# Patient Record
Sex: MALE | Race: WHITE | NOT HISPANIC OR LATINO | Employment: OTHER | URBAN - METROPOLITAN AREA
[De-identification: names, ages, dates, MRNs, and addresses within clinical notes are randomized per-mention and may not be internally consistent; named-entity substitution may affect disease eponyms.]

---

## 2025-08-06 ENCOUNTER — HOSPITAL ENCOUNTER (EMERGENCY)
Facility: HOSPITAL | Age: OVER 89
Discharge: HOME | End: 2025-08-06
Attending: EMERGENCY MEDICINE
Payer: MEDICARE

## 2025-08-06 ENCOUNTER — APPOINTMENT (OUTPATIENT)
Dept: CARDIOLOGY | Facility: HOSPITAL | Age: OVER 89
End: 2025-08-06
Payer: MEDICARE

## 2025-08-06 ENCOUNTER — TELEPHONE (OUTPATIENT)
Dept: HEMATOLOGY/ONCOLOGY | Facility: HOSPITAL | Age: OVER 89
End: 2025-08-06
Payer: MEDICARE

## 2025-08-06 ENCOUNTER — APPOINTMENT (OUTPATIENT)
Dept: RADIOLOGY | Facility: HOSPITAL | Age: OVER 89
End: 2025-08-06
Payer: MEDICARE

## 2025-08-06 VITALS
HEART RATE: 60 BPM | DIASTOLIC BLOOD PRESSURE: 65 MMHG | TEMPERATURE: 97.7 F | WEIGHT: 180 LBS | HEIGHT: 74 IN | BODY MASS INDEX: 23.1 KG/M2 | RESPIRATION RATE: 20 BRPM | OXYGEN SATURATION: 98 % | SYSTOLIC BLOOD PRESSURE: 142 MMHG

## 2025-08-06 DIAGNOSIS — I95.9 HYPOTENSION, UNSPECIFIED HYPOTENSION TYPE: ICD-10-CM

## 2025-08-06 DIAGNOSIS — R91.1 LUNG NODULE: Primary | ICD-10-CM

## 2025-08-06 LAB
ALBUMIN SERPL BCP-MCNC: 4.1 G/DL (ref 3.4–5)
ALP SERPL-CCNC: 61 U/L (ref 33–136)
ALT SERPL W P-5'-P-CCNC: 11 U/L (ref 10–52)
ANION GAP SERPL CALC-SCNC: 12 MMOL/L (ref 10–20)
APPEARANCE UR: CLEAR
AST SERPL W P-5'-P-CCNC: 15 U/L (ref 9–39)
ATRIAL RATE: 66 BPM
BASOPHILS # BLD AUTO: 0.1 X10*3/UL (ref 0–0.1)
BASOPHILS NFR BLD AUTO: 1.1 %
BILIRUB SERPL-MCNC: 0.4 MG/DL (ref 0–1.2)
BILIRUB UR STRIP.AUTO-MCNC: NEGATIVE MG/DL
BNP SERPL-MCNC: 113 PG/ML (ref 0–99)
BUN SERPL-MCNC: 23 MG/DL (ref 6–23)
CALCIUM SERPL-MCNC: 8.9 MG/DL (ref 8.6–10.3)
CARDIAC TROPONIN I PNL SERPL HS: 6 NG/L (ref 0–20)
CARDIAC TROPONIN I PNL SERPL HS: 8 NG/L (ref 0–20)
CHLORIDE SERPL-SCNC: 103 MMOL/L (ref 98–107)
CO2 SERPL-SCNC: 28 MMOL/L (ref 21–32)
COLOR UR: NORMAL
CREAT SERPL-MCNC: 1.46 MG/DL (ref 0.5–1.3)
EGFRCR SERPLBLD CKD-EPI 2021: 44 ML/MIN/1.73M*2
EOSINOPHIL # BLD AUTO: 0.2 X10*3/UL (ref 0–0.4)
EOSINOPHIL NFR BLD AUTO: 2.2 %
ERYTHROCYTE [DISTWIDTH] IN BLOOD BY AUTOMATED COUNT: 12.7 % (ref 11.5–14.5)
FLUAV RNA RESP QL NAA+PROBE: NOT DETECTED
FLUBV RNA RESP QL NAA+PROBE: NOT DETECTED
GLUCOSE SERPL-MCNC: 128 MG/DL (ref 74–99)
GLUCOSE UR STRIP.AUTO-MCNC: NORMAL MG/DL
HCT VFR BLD AUTO: 38.5 % (ref 41–52)
HGB BLD-MCNC: 12.6 G/DL (ref 13.5–17.5)
IMM GRANULOCYTES # BLD AUTO: 0.02 X10*3/UL (ref 0–0.5)
IMM GRANULOCYTES NFR BLD AUTO: 0.2 % (ref 0–0.9)
INR PPP: 1.2 (ref 0.9–1.1)
KETONES UR STRIP.AUTO-MCNC: NEGATIVE MG/DL
LEUKOCYTE ESTERASE UR QL STRIP.AUTO: NEGATIVE
LYMPHOCYTES # BLD AUTO: 1.79 X10*3/UL (ref 0.8–3)
LYMPHOCYTES NFR BLD AUTO: 19.6 %
MAGNESIUM SERPL-MCNC: 2.25 MG/DL (ref 1.6–2.4)
MCH RBC QN AUTO: 29.9 PG (ref 26–34)
MCHC RBC AUTO-ENTMCNC: 32.7 G/DL (ref 32–36)
MCV RBC AUTO: 91 FL (ref 80–100)
MONOCYTES # BLD AUTO: 0.87 X10*3/UL (ref 0.05–0.8)
MONOCYTES NFR BLD AUTO: 9.5 %
NEUTROPHILS # BLD AUTO: 6.15 X10*3/UL (ref 1.6–5.5)
NEUTROPHILS NFR BLD AUTO: 67.4 %
NITRITE UR QL STRIP.AUTO: NEGATIVE
NRBC BLD-RTO: 0 /100 WBCS (ref 0–0)
P AXIS: 37 DEGREES
P OFFSET: 201 MS
P ONSET: 146 MS
PH UR STRIP.AUTO: 7 [PH]
PLATELET # BLD AUTO: 238 X10*3/UL (ref 150–450)
POTASSIUM SERPL-SCNC: 4.3 MMOL/L (ref 3.5–5.3)
PR INTERVAL: 160 MS
PROT SERPL-MCNC: 7 G/DL (ref 6.4–8.2)
PROT UR STRIP.AUTO-MCNC: NEGATIVE MG/DL
PROTHROMBIN TIME: 12.8 SECONDS (ref 9.8–12.4)
Q ONSET: 226 MS
QRS COUNT: 10 BEATS
QRS DURATION: 130 MS
QT INTERVAL: 468 MS
QTC CALCULATION(BAZETT): 490 MS
QTC FREDERICIA: 483 MS
R AXIS: -5 DEGREES
RBC # BLD AUTO: 4.21 X10*6/UL (ref 4.5–5.9)
RBC # UR STRIP.AUTO: NEGATIVE MG/DL
RSV RNA RESP QL NAA+PROBE: NOT DETECTED
SARS-COV-2 RNA RESP QL NAA+PROBE: NOT DETECTED
SODIUM SERPL-SCNC: 139 MMOL/L (ref 136–145)
SP GR UR STRIP.AUTO: 1.01
T AXIS: 20 DEGREES
T OFFSET: 460 MS
UROBILINOGEN UR STRIP.AUTO-MCNC: NORMAL MG/DL
VENTRICULAR RATE: 66 BPM
WBC # BLD AUTO: 9.1 X10*3/UL (ref 4.4–11.3)

## 2025-08-06 PROCEDURE — 84484 ASSAY OF TROPONIN QUANT: CPT

## 2025-08-06 PROCEDURE — 85610 PROTHROMBIN TIME: CPT

## 2025-08-06 PROCEDURE — 2500000004 HC RX 250 GENERAL PHARMACY W/ HCPCS (ALT 636 FOR OP/ED)

## 2025-08-06 PROCEDURE — 99285 EMERGENCY DEPT VISIT HI MDM: CPT | Mod: 25 | Performed by: EMERGENCY MEDICINE

## 2025-08-06 PROCEDURE — 71045 X-RAY EXAM CHEST 1 VIEW: CPT

## 2025-08-06 PROCEDURE — 99285 EMERGENCY DEPT VISIT HI MDM: CPT | Performed by: EMERGENCY MEDICINE

## 2025-08-06 PROCEDURE — 85025 COMPLETE CBC W/AUTO DIFF WBC: CPT

## 2025-08-06 PROCEDURE — 83880 ASSAY OF NATRIURETIC PEPTIDE: CPT

## 2025-08-06 PROCEDURE — 93005 ELECTROCARDIOGRAM TRACING: CPT

## 2025-08-06 PROCEDURE — 83735 ASSAY OF MAGNESIUM: CPT

## 2025-08-06 PROCEDURE — 36415 COLL VENOUS BLD VENIPUNCTURE: CPT

## 2025-08-06 PROCEDURE — 84075 ASSAY ALKALINE PHOSPHATASE: CPT

## 2025-08-06 PROCEDURE — 96360 HYDRATION IV INFUSION INIT: CPT

## 2025-08-06 PROCEDURE — 87637 SARSCOV2&INF A&B&RSV AMP PRB: CPT

## 2025-08-06 PROCEDURE — 71045 X-RAY EXAM CHEST 1 VIEW: CPT | Performed by: RADIOLOGY

## 2025-08-06 PROCEDURE — 81003 URINALYSIS AUTO W/O SCOPE: CPT

## 2025-08-06 RX ADMIN — SODIUM CHLORIDE, SODIUM LACTATE, POTASSIUM CHLORIDE, AND CALCIUM CHLORIDE 1000 ML: .6; .31; .03; .02 INJECTION, SOLUTION INTRAVENOUS at 09:45

## 2025-08-06 ASSESSMENT — PAIN SCALES - GENERAL: PAINLEVEL_OUTOF10: 0 - NO PAIN

## 2025-08-06 ASSESSMENT — PAIN - FUNCTIONAL ASSESSMENT: PAIN_FUNCTIONAL_ASSESSMENT: 0-10

## 2025-08-06 NOTE — DISCHARGE INSTRUCTIONS
You were sent into the emergency department from your assisted living facility for low blood pressure.  Your blood pressure was not low for EMS.  Your blood pressure was not low here.  This may have been an inaccurate blood pressure reading or a transient drop in your blood pressure that resolved spontaneously.    Seek immediate medical attention if you develop: worsening low blood pressure, chest pain, nausea, vomiting, weakness, numbness, tingling, excessive sweating, shortness of breath, difficulty breathing, loss of motion in your arms or legs, or any new or worsening symptoms.

## 2025-08-06 NOTE — ED PROVIDER NOTES
Emergency Department Provider Note        History of Present Illness     History provided by: Patient and EMS  Limitations to History: Dementia  External Records Reviewed with Brief Summary: Medical chart review    HPI:  Carrington Griffith is a 94 y.o. male arrives via EMS from [facility] who reported that the patient appeared to be slightly more confused than his baseline dementia, as well as report that the patient was hypotensive however EMS reports that the nursing facility was using a wrist blood pressure cuff, and that their blood pressure showed systolic over 100 on arrival.  On arrival to the emergency department patient's initial blood pressure is 104/59.  Patient denies chest pain, denies nausea, denies vomiting, states he does not understand why he was sent here.  He denies any dysuria or blood in urine, he denies falls, he denies injury, he is alert to himself his birth date, understands that he is at [facility] and states that his wife is currently at this facility and he was waiting for her to come home.  He does have history significant for hypertension, atrial flutter/atrial fibrillation anticoagulated on Eliquis.  Patient also has history of hypothyroidism hypertension and hyperlipidemia.  Current medications include Remeron, simvastatin, losartan, Synthroid, Eliquis, amiodarone.  Additionally patient has history of vertigo however states that he is not vertiginous on arrival.    Physical Exam   Triage vitals:  T    HR    BP    RR    O2        General: Awake, alert, in no acute distress  Eyes: Gaze conjugate.  No scleral icterus or injection  HENT: Normo-cephalic, atraumatic. No stridor  CV: Regular rate, regular rhythm. Radial pulses 2+ bilaterally  Resp: Breathing non-labored, speaking in full sentences.  Clear to auscultation bilaterally  GI: Soft, non-distended, non-tender. No rebound or guarding.  : Deferred  MSK/Extremities: No gross bony deformities. Moving all  extremities  Skin: Warm. Appropriate color  Neuro: Alert. Oriented. Face symmetric. Speech is fluent.  Gross strength and sensation intact in b/l UE and LEs  Psych: Appropriate mood and affect    Medical Decision Making & ED Course   Medical Decision Makin y.o. male who arrives via EMS from an independent living facility, [facility] living [facility] with no significant complaints.  It is reported by EMS that they were called because the patient seemed to be acting more confused than his baseline dementia, however the patient is able to tell us his birthdate his name and where he is at additionally is aware that his wife is at this facility and is supposed to be discharged today.  Per EMS the independent living facility reported hypotension, however EMS noted that they were using a wrist blood pressure cuff and not taking manual pressures or brachial pressures.  Patient is not hypotensive on arrival.  CBC, CMP, troponin with delta, twelve-lead ECG, chest x-ray to evaluate for signs of infection, influenza/COVID/RSV to investigate possible causes of infection, magnesium to evaluate for electrolyte dyscrasia, urinalysis ordered for evaluation of metabolic causes of possible increased confusion per independent living facility report from EMS.  Please see ED course for further medical decision making.  ----      Differential diagnoses considered include but are not limited to: Paroxysmal hypotension, orthostatic hypotension, urinary tract infection, upper respiratory infection, pneumonia, ACS     Social Determinants of Health which Significantly Impact Care: None identified     EKG Independent Interpretation: EKG interpreted by myself. Please see ED Course for full interpretation.    Independent Result Review and Interpretation: Relevant laboratory and radiographic results were reviewed and independently interpreted by myself.  As necessary, they are commented on in the ED Course.    Chronic  conditions affecting the patient's care: As documented above in MDM    The patient was discussed with the following consultants/services: None    Care Considerations: As documented above in [facility]    ED Course:  ED Course as of 08/06/25 2256   Wed Aug 06, 2025   0939 Twelve-lead ECG as interpreted by me shows normal sinus rhythm with a right bundle branch block ventricular rate of 66 bpm , QTc 490, no acute injury pattern no high-grade AV dottie block. [PV]   1251 Patient has signs of acute kidney injury however received 1 L saline, troponin with delta were negative. [PV]   1251 XR chest 1 view  Chest x-ray shows no acute cardiopulmonary process, urinalysis negative for signs of infection, patient tested negative for influenza, COVID, RSV. [PV]   1300 Blood pressure is now 145/65 with no acute findings on reevaluation.  No acute findings on laboratory evaluation.  Arrangements were made for the patient to be safely transported back to his assisted living facility.  Patient discharged home. [PV]   2254 Color, Urine: Light-Yellow [JG]      ED Course User Index  [JG] Monty Kenny DO  [PV] Alfred Horan DO         Diagnoses as of 08/06/25 2256   Lung nodule   Hypotension, unspecified hypotension type     Disposition   As a result of the work-up, the patient was discharged home.  he was informed of his diagnosis and instructed to come back with any concerns or worsening of condition.  he and was agreeable to the plan as discussed above.  he was given the opportunity to ask questions.  All of the patient's questions were answered.    Procedures   Procedures    Patient seen and discussed with ED attending physician.    Alfred Horan DO  Emergency Medicine    The patient was seen by the resident/fellow.  I have personally performed a substantive portion of the encounter.  I have seen and examined the patient; agree with the workup, evaluation, MDM,   management and diagnosis.  The care plan has been discussed  with the resident; I have reviewed the resident’s note and agree with the documented findings.      This is a 94 y.o. male from nursing home with reported low blood pressure.  Patient is not having any symptoms.  EMS reports that the nursing facility was using a wrist cuff to measure his blood pressure and they got a pressure in the 70s.  EMS states there was no low blood pressure for them.  Patient is not having any symptoms.  There is no dizziness or lightheadedness.  There is no chest pain or shortness of breath.  Has been no vomiting or diarrhea.  States has been eating and drinking well.  Patient does have some dementia so is not the most reliable historian, but he does seem to know a significant portion of his history.  He does have a history of a flutter and he is on a blood thinner.  He does take losartan for blood pressure.  We did try calling the nursing facility to see if he got his blood pressure meds this morning.    Heart is regular.  Lungs are clear.  Abdomen is soft and nontender.  He is in no distress.    EKG: Normal sinus rhythm with a ventricular rate of 66.  Parable 160.  QTc 490.  No ST elevation.  There is a right bundle branch block.  No old EKGs for comparison.    Creatinine mildly elevated.  White count normal.  Mild anemia.  BNP minimally elevated.  Troponin negative x 2.  No UTI.  Chest x-ray negative except for a lung nodule.  Referral to [facility] nodule clinic is ordered.    Patient discharged home.  To follow up with PCP.       Monty Kenny,   08/06/25 [id number]

## 2025-08-06 NOTE — TELEPHONE ENCOUNTER
Patient's listed number is out of order. Per chart review, patient lives at [facility]. Called patient's son, Leonardo. Leonardo and his wife are currently with the patient, patient is about to be discharged from the ER. They are planning to then visit patient's wife who is admitted to the hospital currently. He understandably did not have time to talk.    I explained that I had a non-urgent referral to discuss that was placed by the ER doctor but that we could discuss this later. Leonardo is in agreement with plan and asked that we contact his wife who is a nurse, Jenna, to discuss this. Jenna's phone number is [phone]. I will call her closer to 5pm tonight.

## 2025-08-06 NOTE — TELEPHONE ENCOUNTER
Referral placed to [facility] by Dr. Monty Kenny, [facility] ED, for ~5 mm lung nodule in right lung base, discovered incidentally on CXR imaging today. Advise follow-up w/ his established PCP for further evaluation w/ CT chest.  ANCA Duran.CNP  [facility]

## 2025-08-07 LAB — HOLD SPECIMEN: NORMAL

## 2025-08-07 NOTE — TELEPHONE ENCOUNTER
I called Jenna per family request & we discussed her father-in-law's incidental lung nodule finding on CXR, w/recommendation for CT chest for further evaluation. She is aware of finding & has discussed w/ the family. Due to Mr. Griffith's advanced age & comorbid conditions, they do not wish to pursue any work-up at this point. They will discuss the finding w/ his PCP at his next visit & make a decision then about additional imaging. Jenna has our ph# in the diagnostic clinic & I advised her she could reach out to us at any point w/ questions or issues.  Adeline Vazquez, APRN-CNP

## 2025-08-25 LAB
ATRIAL RATE: 66 BPM
P AXIS: 37 DEGREES
P OFFSET: 201 MS
P ONSET: 146 MS
PR INTERVAL: 160 MS
Q ONSET: 226 MS
QRS COUNT: 10 BEATS
QRS DURATION: 130 MS
QT INTERVAL: 468 MS
QTC CALCULATION(BAZETT): 490 MS
QTC FREDERICIA: 483 MS
R AXIS: -5 DEGREES
T AXIS: 20 DEGREES
T OFFSET: 460 MS
VENTRICULAR RATE: 66 BPM

## 2025-09-02 ENCOUNTER — NURSING HOME VISIT (OUTPATIENT)
Dept: POST ACUTE CARE | Facility: EXTERNAL LOCATION | Age: OVER 89
End: 2025-09-02
Payer: MEDICARE

## 2025-09-02 DIAGNOSIS — M17.0 PRIMARY OSTEOARTHRITIS OF BOTH KNEES: ICD-10-CM

## 2025-09-02 DIAGNOSIS — Z78.9 LIVING IN ASSISTED LIVING: ICD-10-CM

## 2025-09-02 DIAGNOSIS — E03.8 OTHER SPECIFIED HYPOTHYROIDISM: ICD-10-CM

## 2025-09-02 DIAGNOSIS — I10 PRIMARY HYPERTENSION: Primary | ICD-10-CM

## 2025-09-02 DIAGNOSIS — E78.2 MIXED HYPERLIPIDEMIA: ICD-10-CM

## 2025-09-02 DIAGNOSIS — F41.1 GAD (GENERALIZED ANXIETY DISORDER): ICD-10-CM

## 2025-09-02 DIAGNOSIS — F03.90 DEMENTIA, UNSPECIFIED DEMENTIA SEVERITY, UNSPECIFIED DEMENTIA TYPE, UNSPECIFIED WHETHER BEHAVIORAL, PSYCHOTIC, OR MOOD DISTURBANCE OR ANXIETY (MULTI): ICD-10-CM

## 2025-09-02 DIAGNOSIS — R26.81 UNSTEADY GAIT: ICD-10-CM

## 2025-09-02 DIAGNOSIS — K59.01 SLOW TRANSIT CONSTIPATION: ICD-10-CM

## 2025-09-02 ASSESSMENT — PAIN SCALES - GENERAL: PAINLEVEL_OUTOF10: 0-NO PAIN

## 2025-09-03 VITALS
HEIGHT: 74 IN | OXYGEN SATURATION: 96 % | SYSTOLIC BLOOD PRESSURE: 126 MMHG | RESPIRATION RATE: 18 BRPM | DIASTOLIC BLOOD PRESSURE: 58 MMHG | WEIGHT: 177.2 LBS | TEMPERATURE: 97.8 F | BODY MASS INDEX: 22.74 KG/M2 | HEART RATE: 66 BPM

## 2025-09-03 PROBLEM — H91.90 HEARING LOSS: Status: ACTIVE | Noted: 2023-07-05

## 2025-09-03 PROBLEM — E03.8 OTHER SPECIFIED HYPOTHYROIDISM: Status: ACTIVE | Noted: 2023-11-06

## 2025-09-03 PROBLEM — E78.2 MIXED HYPERLIPIDEMIA: Status: ACTIVE | Noted: 2023-07-05

## 2025-09-03 PROBLEM — M17.9 OSTEOARTHRITIS OF KNEE: Status: ACTIVE | Noted: 2023-07-06

## 2025-09-03 PROBLEM — K59.01 SLOW TRANSIT CONSTIPATION: Status: ACTIVE | Noted: 2023-07-05

## 2025-09-03 PROBLEM — I10 PRIMARY HYPERTENSION: Status: ACTIVE | Noted: 2022-03-17

## 2025-09-03 PROBLEM — F41.1 GAD (GENERALIZED ANXIETY DISORDER): Status: ACTIVE | Noted: 2023-07-05

## 2025-09-03 PROBLEM — R41.3 MEMORY LOSS: Status: ACTIVE | Noted: 2023-07-05

## 2025-09-03 PROBLEM — R26.81 UNSTEADY GAIT: Status: ACTIVE | Noted: 2023-02-06

## 2025-09-03 RX ORDER — DOCUSATE SODIUM 100 MG/1
100 CAPSULE, LIQUID FILLED ORAL 2 TIMES DAILY
COMMUNITY

## 2025-09-03 RX ORDER — CHOLECALCIFEROL (VITAMIN D3) 25 MCG
1000 TABLET ORAL
COMMUNITY

## 2025-09-03 RX ORDER — LOSARTAN POTASSIUM 50 MG/1
50 TABLET ORAL
COMMUNITY
Start: 2025-07-15

## 2025-09-03 RX ORDER — AMIODARONE HYDROCHLORIDE 200 MG/1
100 TABLET ORAL
COMMUNITY
Start: 2024-07-12

## 2025-09-03 RX ORDER — SIMVASTATIN 10 MG/1
10 TABLET, FILM COATED ORAL NIGHTLY
COMMUNITY
Start: 2025-08-22

## 2025-09-03 RX ORDER — MIRTAZAPINE 15 MG/1
15 TABLET, FILM COATED ORAL NIGHTLY
COMMUNITY

## 2025-09-03 RX ORDER — POLYETHYLENE GLYCOL 3350 17 G/17G
17 POWDER, FOR SOLUTION ORAL DAILY
COMMUNITY

## 2025-09-03 RX ORDER — ADHESIVE BANDAGE
30 BANDAGE TOPICAL DAILY PRN
COMMUNITY

## 2025-09-03 RX ORDER — LANOLIN ALCOHOL/MO/W.PET/CERES
1000 CREAM (GRAM) TOPICAL
COMMUNITY

## 2025-09-03 RX ORDER — ALUMINUM HYDROXIDE, MAGNESIUM HYDROXIDE, AND SIMETHICONE 1200; 120; 1200 MG/30ML; MG/30ML; MG/30ML
20 SUSPENSION ORAL EVERY 4 HOURS PRN
COMMUNITY

## 2025-09-03 RX ORDER — ACETAMINOPHEN 325 MG/1
650 TABLET ORAL EVERY 6 HOURS PRN
COMMUNITY

## 2025-09-03 RX ORDER — LEVOTHYROXINE SODIUM 75 UG/1
75 TABLET ORAL DAILY
COMMUNITY
Start: 2024-09-19 | End: 2025-09-19

## 2025-09-04 ASSESSMENT — ENCOUNTER SYMPTOMS
SLEEP DISTURBANCE: 0
TROUBLE SWALLOWING: 0
DIZZINESS: 0
FREQUENCY: 0
WEAKNESS: 1
HEADACHES: 0
CONFUSION: 1
SHORTNESS OF BREATH: 0
SORE THROAT: 0
DIARRHEA: 0
COUGH: 0
ACTIVITY CHANGE: 1
VOMITING: 0
APPETITE CHANGE: 0
ARTHRALGIAS: 1
RHINORRHEA: 0
FATIGUE: 0
CONSTIPATION: 0
NAUSEA: 0
NERVOUS/ANXIOUS: 1